# Patient Record
Sex: FEMALE | Race: WHITE | NOT HISPANIC OR LATINO
[De-identification: names, ages, dates, MRNs, and addresses within clinical notes are randomized per-mention and may not be internally consistent; named-entity substitution may affect disease eponyms.]

---

## 2019-01-17 PROBLEM — Z00.00 ENCOUNTER FOR PREVENTIVE HEALTH EXAMINATION: Status: ACTIVE | Noted: 2019-01-17

## 2019-02-14 ENCOUNTER — RECORD ABSTRACTING (OUTPATIENT)
Age: 54
End: 2019-02-14

## 2019-02-14 DIAGNOSIS — E80.4 GILBERT SYNDROME: ICD-10-CM

## 2019-02-14 DIAGNOSIS — I10 ESSENTIAL (PRIMARY) HYPERTENSION: ICD-10-CM

## 2019-02-14 RX ORDER — ZINC OXIDE 13 %
CREAM (GRAM) TOPICAL
Refills: 0 | Status: ACTIVE | COMMUNITY
Start: 2019-02-14

## 2019-02-14 RX ORDER — ELECTROLYTES/DEXTROSE
SOLUTION, ORAL ORAL DAILY
Refills: 0 | Status: ACTIVE | COMMUNITY
Start: 2019-02-14

## 2019-02-19 ENCOUNTER — LABORATORY RESULT (OUTPATIENT)
Age: 54
End: 2019-02-19

## 2019-02-19 ENCOUNTER — APPOINTMENT (OUTPATIENT)
Age: 54
End: 2019-02-19
Payer: COMMERCIAL

## 2019-02-19 VITALS
TEMPERATURE: 97.8 F | HEART RATE: 60 BPM | WEIGHT: 196 LBS | BODY MASS INDEX: 29.7 KG/M2 | DIASTOLIC BLOOD PRESSURE: 87 MMHG | SYSTOLIC BLOOD PRESSURE: 129 MMHG | HEIGHT: 68 IN

## 2019-02-19 DIAGNOSIS — Z80.1 FAMILY HISTORY OF MALIGNANT NEOPLASM OF TRACHEA, BRONCHUS AND LUNG: ICD-10-CM

## 2019-02-19 DIAGNOSIS — Z80.49 FAMILY HISTORY OF MALIGNANT NEOPLASM OF OTHER GENITAL ORGANS: ICD-10-CM

## 2019-02-19 DIAGNOSIS — Z78.9 OTHER SPECIFIED HEALTH STATUS: ICD-10-CM

## 2019-02-19 LAB
HCT VFR BLD CALC: 39.2 %
HGB BLD-MCNC: 13.3 G/DL
MCHC RBC-ENTMCNC: 32.2 PG
MCHC RBC-ENTMCNC: 33.9 GM/DL
MCV RBC AUTO: 95 FL
PLATELET # BLD AUTO: 155 K/UL
RBC # BLD: 4.13 M/UL
RBC # FLD: 11.9 %
WBC # FLD AUTO: 3.8 K/UL

## 2019-02-19 PROCEDURE — 36415 COLL VENOUS BLD VENIPUNCTURE: CPT

## 2019-02-19 PROCEDURE — 85025 COMPLETE CBC W/AUTO DIFF WBC: CPT

## 2019-02-19 PROCEDURE — 99244 OFF/OP CNSLTJ NEW/EST MOD 40: CPT | Mod: 25

## 2019-02-19 RX ORDER — OXYCODONE AND ACETAMINOPHEN 5; 325 MG/1; MG/1
5-325 TABLET ORAL
Qty: 20 | Refills: 0 | Status: DISCONTINUED | COMMUNITY
Start: 2018-09-19

## 2019-02-19 RX ORDER — AMLODIPINE BESYLATE 2.5 MG/1
2.5 TABLET ORAL
Qty: 30 | Refills: 0 | Status: DISCONTINUED | COMMUNITY
Start: 2018-11-18

## 2019-02-19 NOTE — ASSESSMENT
[FreeTextEntry1] : Ms. DOCKERY 's questions were answered to her satisfaction. She expressed her understanding and willingness to comply with the above recommendations, and  will return to the office to review the results of the blood tests in 3 months.\par

## 2019-02-19 NOTE — REVIEW OF SYSTEMS
[Negative] : Allergic/Immunologic [Night Sweats] : no night sweats [Fatigue] : no fatigue [Recent Change In Weight] : ~T recent weight change [Abdominal Pain] : no abdominal pain [Vomiting] : no vomiting [FreeTextEntry2] : lost approximately 60 lbs s/p bariatric surgery ( September 2018)

## 2019-02-19 NOTE — CONSULT LETTER
[Dear  ___] : Dear  [unfilled], [Consult Letter:] : I had the pleasure of evaluating your patient, [unfilled]. [Please see my note below.] : Please see my note below. [Consult Closing:] : Thank you very much for allowing me to participate in the care of this patient.  If you have any questions, please do not hesitate to contact me. [Sincerely,] : Sincerely, [FreeTextEntry2] : Bony Box M.D. [FreeTextEntry3] : PERCY LOTT M.D.\par Hematology/ Oncology\par [facility] [place]\par \par

## 2019-02-19 NOTE — PHYSICAL EXAM
[Fully active, able to carry on all pre-disease performance without restriction] : Status 0 - Fully active, able to carry on all pre-disease performance without restriction [Normal] : normal spine exam without palpable tenderness, no kyphosis or scoliosis [de-identified] : right breast saline implant; impalnt valve palpable in right axilla; no breast masses bilaterally.

## 2019-02-19 NOTE — REASON FOR VISIT
[Initial Consultation] : an initial consultation for [FreeTextEntry2] : Leukopenia; Elevated ferritin

## 2019-02-19 NOTE — HISTORY OF PRESENT ILLNESS
[de-identified] : 54 female with history of obesity, status post gastric sleeve bariatric surgery in September 2018, history of uterine fibroids and status post left breast reduction, who presents with isolated leukopenia and mildly elevated ferritin Patient is otherwise asymptomatic she has recovered well postoperatively. Denies personal or family history of hematologic malignancy/disorders. She was also found with increased ferritin , likely reactive.Postoperative she lost 60 lbs. She is of  descent ( [place] and [place]), never tested for hemochromatosis. Recent preoperative work up included imaging of abdomen, which failed to show liver pathology. Patient denies frequent infections or hospitalizations.Family history consistent with 2 of her children diagnosed with porphyria.Today's WBC in normal range. [FreeTextEntry1] : expectant surveillance\par \par

## 2019-02-21 LAB
ALBUMIN SERPL ELPH-MCNC: 4.2 G/DL
ALP BLD-CCNC: 67 U/L
ALT SERPL-CCNC: 21 U/L
ANION GAP SERPL CALC-SCNC: 12 MMOL/L
AST SERPL-CCNC: 21 U/L
BILIRUB INDIRECT SERPL-MCNC: 1 MG/DL
BILIRUB SERPL-MCNC: 1.3 MG/DL
BUN SERPL-MCNC: 15 MG/DL
CALCIUM SERPL-MCNC: 9.4 MG/DL
CHLORIDE SERPL-SCNC: 107 MMOL/L
CO2 SERPL-SCNC: 27 MMOL/L
CREAT SERPL-MCNC: 0.64 MG/DL
DIRECT COOMBS: NORMAL
FERRITIN SERPL-MCNC: 129 NG/ML
GLUCOSE SERPL-MCNC: 96 MG/DL
HAPTOGLOB SERPL-MCNC: 70 MG/DL
LDH SERPL-CCNC: 196 U/L
POTASSIUM SERPL-SCNC: 4.6 MMOL/L
PROT SERPL-MCNC: 6.9 G/DL
RBC # BLD: 4.17 M/UL
RETICS # AUTO: 1.2 %
RETICS AGGREG/RBC NFR: 48.4 K/UL
SODIUM SERPL-SCNC: 146 MMOL/L

## 2019-05-14 ENCOUNTER — LABORATORY RESULT (OUTPATIENT)
Age: 54
End: 2019-05-14

## 2019-05-14 ENCOUNTER — APPOINTMENT (OUTPATIENT)
Age: 54
End: 2019-05-14
Payer: COMMERCIAL

## 2019-05-14 VITALS
WEIGHT: 192.13 LBS | SYSTOLIC BLOOD PRESSURE: 147 MMHG | HEIGHT: 68 IN | HEART RATE: 65 BPM | BODY MASS INDEX: 29.12 KG/M2 | DIASTOLIC BLOOD PRESSURE: 81 MMHG | TEMPERATURE: 97.9 F

## 2019-05-14 LAB
HCT VFR BLD CALC: 41 %
HGB BLD-MCNC: 13.9 G/DL
MCHC RBC-ENTMCNC: 31.9 PG
MCHC RBC-ENTMCNC: 33.9 GM/DL
MCV RBC AUTO: 94.1 FL
PLATELET # BLD AUTO: 185 K/UL
RBC # BLD: 4.35 M/UL
RBC # FLD: 11.8 %
WBC # FLD AUTO: 4.3 K/UL

## 2019-05-14 PROCEDURE — 85025 COMPLETE CBC W/AUTO DIFF WBC: CPT

## 2019-05-14 PROCEDURE — 36415 COLL VENOUS BLD VENIPUNCTURE: CPT

## 2019-05-14 PROCEDURE — 99214 OFFICE O/P EST MOD 30 MIN: CPT | Mod: 25

## 2019-05-14 RX ORDER — CA/D3/MAG OX/ZINC/COP/MANG/BOR 600 MG-800
250 MCG TABLET,CHEWABLE ORAL
Refills: 0 | Status: DISCONTINUED | COMMUNITY
Start: 2019-02-14 | End: 2019-05-14

## 2019-05-14 RX ORDER — BIOTIN 10000 MCG
10 CAPSULE ORAL
Refills: 0 | Status: DISCONTINUED | COMMUNITY
Start: 2019-02-14 | End: 2019-05-14

## 2019-05-14 RX ORDER — OMEPRAZOLE 40 MG/1
40 CAPSULE, DELAYED RELEASE ORAL
Refills: 3 | Status: DISCONTINUED | COMMUNITY
Start: 2019-02-14 | End: 2019-05-14

## 2019-05-14 NOTE — PHYSICAL EXAM
[Fully active, able to carry on all pre-disease performance without restriction] : Status 0 - Fully active, able to carry on all pre-disease performance without restriction [Normal] : grossly intact [de-identified] : right breast saline implant; implant valve palpable in right axilla; no breast masses bilaterally.

## 2019-05-14 NOTE — HISTORY OF PRESENT ILLNESS
[de-identified] : 54 female with history of  isolated leukopenia and mildly elevated ferritin . [FreeTextEntry1] : expectant surveillance\par \par  [de-identified] : Patient last seen 3 months ago for elevated ferritin and leukopenia. Workup ruled out an underlying iron metabolism disorder, or hemolysis, and WBC has improved. Clinical status unchanged. Denies B symptoms. Appetite and weight preserved. No changes in medications. Denies hospitalizations; continues to work full time. Today’s WBC 4.3 K.

## 2019-05-14 NOTE — REVIEW OF SYSTEMS
[Recent Change In Weight] : ~T recent weight change [Negative] : Integumentary [Night Sweats] : no night sweats [Fatigue] : no fatigue [Abdominal Pain] : no abdominal pain [Vomiting] : no vomiting [FreeTextEntry2] : lost approximately 60 lbs s/p bariatric surgery ( September 2018)

## 2019-05-15 LAB — FERRITIN SERPL-MCNC: 96 NG/ML

## 2019-10-29 ENCOUNTER — APPOINTMENT (OUTPATIENT)
Age: 54
End: 2019-10-29
Payer: COMMERCIAL

## 2019-10-29 VITALS
RESPIRATION RATE: 16 BRPM | SYSTOLIC BLOOD PRESSURE: 131 MMHG | TEMPERATURE: 98.6 F | BODY MASS INDEX: 27.28 KG/M2 | DIASTOLIC BLOOD PRESSURE: 82 MMHG | HEIGHT: 68 IN | HEART RATE: 62 BPM | WEIGHT: 180 LBS

## 2019-10-29 DIAGNOSIS — D70.8 OTHER NEUTROPENIA: ICD-10-CM

## 2019-10-29 DIAGNOSIS — R79.89 OTHER SPECIFIED ABNORMAL FINDINGS OF BLOOD CHEMISTRY: ICD-10-CM

## 2019-10-29 LAB
BASOPHILS # BLD AUTO: 0.04 K/UL
BASOPHILS NFR BLD AUTO: 0.9 %
EOSINOPHIL # BLD AUTO: 0.13 K/UL
EOSINOPHIL NFR BLD AUTO: 2.8 %
FERRITIN SERPL-MCNC: 77 NG/ML
HCT VFR BLD CALC: 41.8 %
HGB BLD-MCNC: 13.3 G/DL
IMM GRANULOCYTES NFR BLD AUTO: 0.2 %
LYMPHOCYTES # BLD AUTO: 1.74 K/UL
LYMPHOCYTES NFR BLD AUTO: 37.8 %
MAN DIFF?: NORMAL
MCHC RBC-ENTMCNC: 31.6 PG
MCHC RBC-ENTMCNC: 31.8 GM/DL
MCV RBC AUTO: 99.3 FL
MONOCYTES # BLD AUTO: 0.37 K/UL
MONOCYTES NFR BLD AUTO: 8 %
NEUTROPHILS # BLD AUTO: 2.31 K/UL
NEUTROPHILS NFR BLD AUTO: 50.3 %
PLATELET # BLD AUTO: 195 K/UL
RBC # BLD: 4.21 M/UL
RBC # FLD: 12.1 %
WBC # FLD AUTO: 4.6 K/UL

## 2019-10-29 PROCEDURE — 99213 OFFICE O/P EST LOW 20 MIN: CPT

## 2019-10-29 NOTE — PHYSICAL EXAM
[Fully active, able to carry on all pre-disease performance without restriction] : Status 0 - Fully active, able to carry on all pre-disease performance without restriction [Normal] : grossly intact [de-identified] : right breast saline implant; implant valve palpable in right axilla; no breast masses bilaterally.

## 2019-10-29 NOTE — HISTORY OF PRESENT ILLNESS
[de-identified] : 54 female with history of  isolated leukopenia and mildly elevated ferritin . [FreeTextEntry1] : expectant surveillance\par \par  [de-identified] : Returning for her biannual followup of leukopenia and elevated ferritin; last seen in the office in May 2019. Since then patient has been clinically stable, with normal recurrent infections, no hospitalizations. Previous workup indicated normalization of WBC and ferritin.On August 8, 2019 Dr. Alyx Hanna performed right breast implant removal and left breast reduction. Pathology negative for malignancy.

## 2024-11-09 NOTE — RESULTS/DATA
[Patient] : patient [FreeTextEntry1] : I reviewed recent blood work results with patient.\par  2g/500 mg